# Patient Record
Sex: FEMALE | Race: WHITE | NOT HISPANIC OR LATINO | Employment: OTHER | URBAN - METROPOLITAN AREA
[De-identification: names, ages, dates, MRNs, and addresses within clinical notes are randomized per-mention and may not be internally consistent; named-entity substitution may affect disease eponyms.]

---

## 2022-12-05 ENCOUNTER — HOSPITAL ENCOUNTER (INPATIENT)
Facility: MEDICAL CENTER | Age: 67
LOS: 2 days | DRG: 176 | End: 2022-12-07
Attending: STUDENT IN AN ORGANIZED HEALTH CARE EDUCATION/TRAINING PROGRAM | Admitting: HOSPITALIST
Payer: MEDICARE

## 2022-12-05 DIAGNOSIS — I26.99 BILATERAL PULMONARY EMBOLISM (HCC): ICD-10-CM

## 2022-12-05 PROBLEM — R03.0 ELEVATED BLOOD PRESSURE READING: Status: ACTIVE | Noted: 2022-12-05

## 2022-12-05 PROBLEM — R79.89 ELEVATED TROPONIN: Status: ACTIVE | Noted: 2022-12-05

## 2022-12-05 LAB
EKG IMPRESSION: NORMAL
EST. AVERAGE GLUCOSE BLD GHB EST-MCNC: 108 MG/DL
HBA1C MFR BLD: 5.4 % (ref 4–5.6)
MAGNESIUM SERPL-MCNC: 2.3 MG/DL (ref 1.5–2.5)
TROPONIN T SERPL-MCNC: 107 NG/L (ref 6–19)

## 2022-12-05 PROCEDURE — 83735 ASSAY OF MAGNESIUM: CPT

## 2022-12-05 PROCEDURE — 99223 1ST HOSP IP/OBS HIGH 75: CPT | Performed by: HOSPITALIST

## 2022-12-05 PROCEDURE — 770020 HCHG ROOM/CARE - TELE (206)

## 2022-12-05 PROCEDURE — 700105 HCHG RX REV CODE 258: Performed by: HOSPITALIST

## 2022-12-05 PROCEDURE — 93010 ELECTROCARDIOGRAM REPORT: CPT | Performed by: INTERNAL MEDICINE

## 2022-12-05 PROCEDURE — 84484 ASSAY OF TROPONIN QUANT: CPT

## 2022-12-05 PROCEDURE — 36415 COLL VENOUS BLD VENIPUNCTURE: CPT

## 2022-12-05 PROCEDURE — 83036 HEMOGLOBIN GLYCOSYLATED A1C: CPT

## 2022-12-05 PROCEDURE — 93005 ELECTROCARDIOGRAM TRACING: CPT | Performed by: HOSPITALIST

## 2022-12-05 RX ORDER — ACETAMINOPHEN 325 MG/1
650 TABLET ORAL EVERY 6 HOURS PRN
Status: DISCONTINUED | OUTPATIENT
Start: 2022-12-05 | End: 2022-12-05

## 2022-12-05 RX ORDER — SODIUM CHLORIDE 9 MG/ML
INJECTION, SOLUTION INTRAVENOUS CONTINUOUS
Status: DISCONTINUED | OUTPATIENT
Start: 2022-12-05 | End: 2022-12-06

## 2022-12-05 RX ORDER — ACETAMINOPHEN 325 MG/1
650 TABLET ORAL EVERY 6 HOURS PRN
Status: DISCONTINUED | OUTPATIENT
Start: 2022-12-05 | End: 2022-12-07 | Stop reason: HOSPADM

## 2022-12-05 RX ORDER — POLYETHYLENE GLYCOL 3350 17 G/17G
1 POWDER, FOR SOLUTION ORAL
Status: DISCONTINUED | OUTPATIENT
Start: 2022-12-05 | End: 2022-12-07 | Stop reason: HOSPADM

## 2022-12-05 RX ORDER — ASPIRIN 81 MG/1
81 TABLET, CHEWABLE ORAL DAILY
Status: ON HOLD | COMMUNITY
End: 2022-12-07

## 2022-12-05 RX ORDER — ENOXAPARIN SODIUM 100 MG/ML
1 INJECTION SUBCUTANEOUS EVERY 12 HOURS
Status: DISCONTINUED | OUTPATIENT
Start: 2022-12-05 | End: 2022-12-06

## 2022-12-05 RX ORDER — ONDANSETRON 2 MG/ML
4 INJECTION INTRAMUSCULAR; INTRAVENOUS EVERY 4 HOURS PRN
Status: DISCONTINUED | OUTPATIENT
Start: 2022-12-05 | End: 2022-12-07 | Stop reason: HOSPADM

## 2022-12-05 RX ORDER — ONDANSETRON 4 MG/1
4 TABLET, ORALLY DISINTEGRATING ORAL EVERY 4 HOURS PRN
Status: DISCONTINUED | OUTPATIENT
Start: 2022-12-05 | End: 2022-12-07 | Stop reason: HOSPADM

## 2022-12-05 RX ORDER — BISACODYL 10 MG
10 SUPPOSITORY, RECTAL RECTAL
Status: DISCONTINUED | OUTPATIENT
Start: 2022-12-05 | End: 2022-12-07 | Stop reason: HOSPADM

## 2022-12-05 RX ORDER — AMOXICILLIN 250 MG
2 CAPSULE ORAL 2 TIMES DAILY
Status: DISCONTINUED | OUTPATIENT
Start: 2022-12-05 | End: 2022-12-07 | Stop reason: HOSPADM

## 2022-12-05 RX ADMIN — SODIUM CHLORIDE: 9 INJECTION, SOLUTION INTRAVENOUS at 22:28

## 2022-12-05 ASSESSMENT — ENCOUNTER SYMPTOMS
MYALGIAS: 0
WHEEZING: 0
BLURRED VISION: 0
NAUSEA: 0
HEADACHES: 0
SINUS PAIN: 0
DOUBLE VISION: 0
VOMITING: 0
CLAUDICATION: 0
PALPITATIONS: 0
BRUISES/BLEEDS EASILY: 0
COUGH: 0
HEARTBURN: 0
DEPRESSION: 0
SHORTNESS OF BREATH: 1
BACK PAIN: 0
DIZZINESS: 0
CHILLS: 0
FEVER: 0
PND: 0
HEMOPTYSIS: 0

## 2022-12-05 ASSESSMENT — COGNITIVE AND FUNCTIONAL STATUS - GENERAL
MOVING TO AND FROM BED TO CHAIR: A LITTLE
SUGGESTED CMS G CODE MODIFIER DAILY ACTIVITY: CJ
WALKING IN HOSPITAL ROOM: A LITTLE
MOVING FROM LYING ON BACK TO SITTING ON SIDE OF FLAT BED: A LITTLE
DRESSING REGULAR LOWER BODY CLOTHING: A LITTLE
STANDING UP FROM CHAIR USING ARMS: A LITTLE
HELP NEEDED FOR BATHING: A LITTLE
TURNING FROM BACK TO SIDE WHILE IN FLAT BAD: A LITTLE
CLIMB 3 TO 5 STEPS WITH RAILING: A LOT
SUGGESTED CMS G CODE MODIFIER MOBILITY: CK
DAILY ACTIVITIY SCORE: 21
MOBILITY SCORE: 17
DRESSING REGULAR UPPER BODY CLOTHING: A LITTLE

## 2022-12-05 ASSESSMENT — PATIENT HEALTH QUESTIONNAIRE - PHQ9
5. POOR APPETITE OR OVEREATING: NOT AT ALL
1. LITTLE INTEREST OR PLEASURE IN DOING THINGS: NOT AT ALL
8. MOVING OR SPEAKING SO SLOWLY THAT OTHER PEOPLE COULD HAVE NOTICED. OR THE OPPOSITE, BEING SO FIGETY OR RESTLESS THAT YOU HAVE BEEN MOVING AROUND A LOT MORE THAN USUAL: NOT AT ALL
SUM OF ALL RESPONSES TO PHQ9 QUESTIONS 1 AND 2: 1
6. FEELING BAD ABOUT YOURSELF - OR THAT YOU ARE A FAILURE OR HAVE LET YOURSELF OR YOUR FAMILY DOWN: NOT AL ALL
SUM OF ALL RESPONSES TO PHQ QUESTIONS 1-9: 2
4. FEELING TIRED OR HAVING LITTLE ENERGY: SEVERAL DAYS
2. FEELING DOWN, DEPRESSED, IRRITABLE, OR HOPELESS: SEVERAL DAYS
9. THOUGHTS THAT YOU WOULD BE BETTER OFF DEAD, OR OF HURTING YOURSELF: NOT AT ALL
3. TROUBLE FALLING OR STAYING ASLEEP OR SLEEPING TOO MUCH: NOT AT ALL
7. TROUBLE CONCENTRATING ON THINGS, SUCH AS READING THE NEWSPAPER OR WATCHING TELEVISION: NOT AT ALL

## 2022-12-05 ASSESSMENT — LIFESTYLE VARIABLES
HOW MANY TIMES IN THE PAST YEAR HAVE YOU HAD 5 OR MORE DRINKS IN A DAY: 0
CONSUMPTION TOTAL: NEGATIVE
HAVE YOU EVER FELT YOU SHOULD CUT DOWN ON YOUR DRINKING: NO
TOTAL SCORE: 0
EVER FELT BAD OR GUILTY ABOUT YOUR DRINKING: NO
HAVE PEOPLE ANNOYED YOU BY CRITICIZING YOUR DRINKING: NO
TOTAL SCORE: 0
ON A TYPICAL DAY WHEN YOU DRINK ALCOHOL HOW MANY DRINKS DO YOU HAVE: 0
AVERAGE NUMBER OF DAYS PER WEEK YOU HAVE A DRINK CONTAINING ALCOHOL: 0
TOTAL SCORE: 0
EVER HAD A DRINK FIRST THING IN THE MORNING TO STEADY YOUR NERVES TO GET RID OF A HANGOVER: NO
ALCOHOL_USE: NO
DOES PATIENT WANT TO STOP DRINKING: NO

## 2022-12-05 ASSESSMENT — PAIN DESCRIPTION - PAIN TYPE: TYPE: ACUTE PAIN

## 2022-12-05 NOTE — PROGRESS NOTES
[facility] HOSPITALIST TRIAGE OFFICER DIRECT ADMISSION REPORT  Transferring facility: [facility]  Transferring physician: Dr Bowen  Chief complaint:   Presented with SOB, CTA chest showed bilateral PE. Denies chest pain. Vitals requiring 3L O2, hemodynamically stable. Labs show Trop 81 initially and increased to 92. BNP 2k.     Further work up or recommendations per triage officer prior to transfer: trend troponin  Consultants called prior to transfer and pertinent input from consultants: none  Patient accepted for transfer: Yes  Consultants to be called upon arrival: none  Admission status: Inpatient.   Floor requested: tele      Please inform the triage officer upon arrival of the patient to [facility] for assignment of a hospitalist to perform admission.     For any question or concerns regarding the care of this patient, please reach out to the assigned hospitalist.

## 2022-12-06 ENCOUNTER — APPOINTMENT (OUTPATIENT)
Dept: CARDIOLOGY | Facility: MEDICAL CENTER | Age: 67
DRG: 176 | End: 2022-12-06
Attending: HOSPITALIST
Payer: MEDICARE

## 2022-12-06 LAB
ALBUMIN SERPL BCP-MCNC: 3.5 G/DL (ref 3.2–4.9)
ALBUMIN/GLOB SERPL: 1.1 G/DL
ALP SERPL-CCNC: 106 U/L (ref 30–99)
ALT SERPL-CCNC: 85 U/L (ref 2–50)
ANION GAP SERPL CALC-SCNC: 10 MMOL/L (ref 7–16)
AST SERPL-CCNC: 179 U/L (ref 12–45)
BILIRUB SERPL-MCNC: 0.4 MG/DL (ref 0.1–1.5)
BUN SERPL-MCNC: 19 MG/DL (ref 8–22)
CALCIUM SERPL-MCNC: 8.7 MG/DL (ref 8.5–10.5)
CHLORIDE SERPL-SCNC: 107 MMOL/L (ref 96–112)
CHOLEST SERPL-MCNC: 152 MG/DL (ref 100–199)
CO2 SERPL-SCNC: 21 MMOL/L (ref 20–33)
CREAT SERPL-MCNC: 1.02 MG/DL (ref 0.5–1.4)
ERYTHROCYTE [DISTWIDTH] IN BLOOD BY AUTOMATED COUNT: 48.4 FL (ref 35.9–50)
GFR SERPLBLD CREATININE-BSD FMLA CKD-EPI: 61 ML/MIN/1.73 M 2
GLOBULIN SER CALC-MCNC: 3.3 G/DL (ref 1.9–3.5)
GLUCOSE SERPL-MCNC: 103 MG/DL (ref 65–99)
HCT VFR BLD AUTO: 36.9 % (ref 37–47)
HDLC SERPL-MCNC: 34 MG/DL
HGB BLD-MCNC: 11.9 G/DL (ref 12–16)
LDLC SERPL CALC-MCNC: 92 MG/DL
LV EJECT FRACT  99904: 55
LV EJECT FRACT MOD 2C 99903: 34.71
LV EJECT FRACT MOD 4C 99902: 64.09
LV EJECT FRACT MOD BP 99901: 53.3
MCH RBC QN AUTO: 29.8 PG (ref 27–33)
MCHC RBC AUTO-ENTMCNC: 32.2 G/DL (ref 33.6–35)
MCV RBC AUTO: 92.5 FL (ref 81.4–97.8)
PLATELET # BLD AUTO: 210 K/UL (ref 164–446)
PMV BLD AUTO: 10.3 FL (ref 9–12.9)
POTASSIUM SERPL-SCNC: 3.9 MMOL/L (ref 3.6–5.5)
PROT SERPL-MCNC: 6.8 G/DL (ref 6–8.2)
RBC # BLD AUTO: 3.99 M/UL (ref 4.2–5.4)
SODIUM SERPL-SCNC: 138 MMOL/L (ref 135–145)
TRIGL SERPL-MCNC: 130 MG/DL (ref 0–149)
WBC # BLD AUTO: 5.4 K/UL (ref 4.8–10.8)

## 2022-12-06 PROCEDURE — 97163 PT EVAL HIGH COMPLEX 45 MIN: CPT

## 2022-12-06 PROCEDURE — 80053 COMPREHEN METABOLIC PANEL: CPT

## 2022-12-06 PROCEDURE — 85027 COMPLETE CBC AUTOMATED: CPT

## 2022-12-06 PROCEDURE — 700102 HCHG RX REV CODE 250 W/ 637 OVERRIDE(OP): Performed by: HOSPITALIST

## 2022-12-06 PROCEDURE — 97162 PT EVAL MOD COMPLEX 30 MIN: CPT

## 2022-12-06 PROCEDURE — 80061 LIPID PANEL: CPT

## 2022-12-06 PROCEDURE — 93306 TTE W/DOPPLER COMPLETE: CPT

## 2022-12-06 PROCEDURE — 770020 HCHG ROOM/CARE - TELE (206)

## 2022-12-06 PROCEDURE — 99232 SBSQ HOSP IP/OBS MODERATE 35: CPT | Performed by: HOSPITALIST

## 2022-12-06 PROCEDURE — 97166 OT EVAL MOD COMPLEX 45 MIN: CPT

## 2022-12-06 PROCEDURE — 700111 HCHG RX REV CODE 636 W/ 250 OVERRIDE (IP): Performed by: HOSPITALIST

## 2022-12-06 PROCEDURE — 93306 TTE W/DOPPLER COMPLETE: CPT | Mod: 26 | Performed by: INTERNAL MEDICINE

## 2022-12-06 PROCEDURE — A9270 NON-COVERED ITEM OR SERVICE: HCPCS | Performed by: HOSPITALIST

## 2022-12-06 RX ADMIN — ENOXAPARIN SODIUM 80 MG: 80 INJECTION SUBCUTANEOUS at 11:32

## 2022-12-06 RX ADMIN — APIXABAN 10 MG: 5 TABLET, FILM COATED ORAL at 17:23

## 2022-12-06 RX ADMIN — ENOXAPARIN SODIUM 80 MG: 80 INJECTION SUBCUTANEOUS at 01:48

## 2022-12-06 ASSESSMENT — ENCOUNTER SYMPTOMS
STRIDOR: 0
SPEECH CHANGE: 0
NERVOUS/ANXIOUS: 0
DEPRESSION: 0
HEADACHES: 0
NAUSEA: 0
COUGH: 0
EYE DISCHARGE: 0
DIZZINESS: 0
ABDOMINAL PAIN: 0
PALPITATIONS: 0
FEVER: 0
SHORTNESS OF BREATH: 1
CHILLS: 0
BACK PAIN: 0

## 2022-12-06 ASSESSMENT — COGNITIVE AND FUNCTIONAL STATUS - GENERAL
TOILETING: A LOT
WALKING IN HOSPITAL ROOM: A LITTLE
SUGGESTED CMS G CODE MODIFIER MOBILITY: CJ
DAILY ACTIVITIY SCORE: 17
HELP NEEDED FOR BATHING: A LOT
SUGGESTED CMS G CODE MODIFIER DAILY ACTIVITY: CK
MOBILITY SCORE: 22
CLIMB 3 TO 5 STEPS WITH RAILING: A LITTLE
DRESSING REGULAR LOWER BODY CLOTHING: A LITTLE
PERSONAL GROOMING: A LITTLE
DRESSING REGULAR UPPER BODY CLOTHING: A LITTLE

## 2022-12-06 ASSESSMENT — COPD QUESTIONNAIRES
DURING THE PAST 4 WEEKS HOW MUCH DID YOU FEEL SHORT OF BREATH: NONE/LITTLE OF THE TIME
DO YOU EVER COUGH UP ANY MUCUS OR PHLEGM?: NO/ONLY WITH OCCASIONAL COLDS OR INFECTIONS
COPD SCREENING SCORE: 2
HAVE YOU SMOKED AT LEAST 100 CIGARETTES IN YOUR ENTIRE LIFE: NO/DON'T KNOW

## 2022-12-06 ASSESSMENT — GAIT ASSESSMENTS
DEVIATION: ANTALGIC
ASSISTIVE DEVICE: FRONT WHEEL WALKER
GAIT LEVEL OF ASSIST: CONTACT GUARD ASSIST
DISTANCE (FEET): 125

## 2022-12-06 ASSESSMENT — ACTIVITIES OF DAILY LIVING (ADL): TOILETING: INDEPENDENT

## 2022-12-06 ASSESSMENT — FIBROSIS 4 INDEX: FIB4 SCORE: 6.01

## 2022-12-06 NOTE — THERAPY
Physical Therapy   Initial Evaluation     Patient Name: Tracy Herron  Age:  67 y.o., Sex:  female  Medical Record #: [id number]  Today's Date: 12/6/2022          Assessment  Patient is 67 y.o. female with no significant past medical history, she is only on aspirin, is coming today referred from outside hospital due to shortness of breath, and left lower extremity pain, apparently patient was sitting in her couch for several hours, when she stood up to go to the bathroom she started complaining of left lower extremity pain, she denies any swelling, patient also has been complaining of shortness of breath that made her go to the emergency room, in the emergency room patient had CTA of her chest + bilateral PE, patient has been started on Lovenox, patient had elevated troponin at 81 then trending up to 92, patient was transferred to [facility] for higher level of care to consider cardiology evaluation due to elevated troponin, left lower extremity ultrasound did not show DVT,.  Additional factors influencing patient status / progress : today, pt is supervised for most activity, though struggles with ambulation towards end due to chronic, painful B knees (pt states needs to have B TKA) and recent L LE pain from groin to ankle. PT to follow to trial stairs and progress activity. .      Plan    Recommend Physical Therapy 3 times per week until therapy goals are met for the following treatments:  Bed Mobility, Gait Training, Neuro Re-Education / Balance, Stair Training, and Therapeutic Activities    DC Equipment Recommendations: None  Discharge Recommendations: Recommend post-acute placement for additional physical therapy services prior to discharge home (vs home with family assist depending on progress)          Objective       12/06/22 0947   Charge Group   PT Evaluation PT Evaluation Mod   Total Time Spent   PT Total Time Yes   PT Evaluation Time Spent (Mins) 30   PT Total Time Spent (Calculated) 30   Initial Contact  Note    Initial Contact Note Order Received and Verified, Physical Therapy Evaluation in Progress with Full Report to Follow.   Vitals   Pulse 94   Pulse Oximetry 91 %   O2 (LPM) 3   O2 Delivery Device Silicone Nasal Cannula   Vitals Comments unable to obtain reliable pulse ox readings during ambulation.   Pain 0 - 10 Group   Therapist Pain Assessment During Activity;Nurse Notified  (B knee pain and L groin to ankle painful, not rated.)   Prior Living Situation   Prior Services None   Housing / Facility Mobile Home   Steps Into Home 3   Steps In Home 0   Rail Right Rail (Steps into Home)   Elevator No   Equipment Owned Single Point Cane;Wheelchair;Front-Wheel Walker   Lives with - Patient's Self Care Capacity Spouse;Adult Children   Comments spouse took this week off to help.   Prior Level of Functional Mobility   Bed Mobility Independent   Transfer Status Independent   Ambulation Independent   Distance Ambulation (Feet)   (community, does not own car, does not work, only limit to baseline activity is B knee pain as she says she needs B TKA)   Assistive Devices Used None  (difficult to obtain answer re AD use at home, says needed wc yesterday but not at baseline.)   History of Falls   History of Falls No  (pt denies any falls.)   Cognition    Cognition / Consciousness WDL   Level of Consciousness Alert   Active ROM Lower Body    Active ROM Lower Body  WDL   Strength Lower Body   Lower Body Strength  X   Comments functional for brief wt bearing, but knee pain limits endurance.   Balance Assessment   Sitting Balance (Static) Fair   Sitting Balance (Dynamic) Fair   Standing Balance (Static) Fair   Standing Balance (Dynamic) Fair -   Weight Shift Sitting Fair   Weight Shift Standing Fair   Comments with FWW   Gait Analysis   Gait Level Of Assist Contact Guard Assist   Assistive Device Front Wheel Walker   Distance (Feet) 125   # of Times Distance was Traveled 1   Deviation Antalgic   # of Stairs Climbed 0   Weight  Bearing Status no restrictions   Bed Mobility    Supine to Sit Supervised   Sit to Supine Supervised   Scooting Supervised   Rolling Supervised   Functional Mobility   Sit to Stand Supervised   Bed, Chair, Wheelchair Transfer Supervised   Transfer Method Stand Pivot   How much difficulty does the patient currently have...   Turning over in bed (including adjusting bedclothes, sheets and blankets)? 4   Sitting down on and standing up from a chair with arms (e.g., wheelchair, bedside commode, etc.) 4   Moving from lying on back to sitting on the side of the bed? 4   How much help from another person does the patient currently need...   Moving to and from a bed to a chair (including a wheelchair)? 4   Need to walk in a hospital room? 3   Climbing 3-5 steps with a railing? 3   6 clicks Mobility Score 22   Activity Tolerance   Standing 5 min   Short Term Goals    Short Term Goal # 1 Pt will negotiate 3 stairs with supervision to access home in 6 visits.   Short Term Goal # 2 Pt will ambulate x 125 feet using FWW with mod indep in 6 visits to improve functional indep.   Education Group   Education Provided Role of Physical Therapist   Role of Physical Therapist Patient Response Patient;Acceptance;Explanation;Verbal Demonstration   Problem List    Problems Decreased Activity Tolerance   Anticipated Discharge Equipment and Recommendations   DC Equipment Recommendations None   Discharge Recommendations Recommend post-acute placement for additional physical therapy services prior to discharge home  (vs home with family assist depending on progress)   Interdisciplinary Plan of Care Collaboration   IDT Collaboration with  Nursing   Patient Position at End of Therapy Seated;Call Light within Reach;Tray Table within Reach;Phone within Reach;Bed Alarm On   Collaboration Comments nsg updated   Session Information   Date / Session Number  12/6-1 (1/3, 12/12)

## 2022-12-06 NOTE — PROGRESS NOTES
Direct admit received, pt care assumed, assessment completed. Pt is A&O 4, pain 0/10, sinus rhythm on the monitor. Updated on POC, questions answered. Bed in lowest, locked position, treaded socks on, call light and belongings within reach. Fall precautions in place.

## 2022-12-06 NOTE — THERAPY
Occupational Therapy   Initial Evaluation     Patient Name: Tracy Herron  Age:  67 y.o., Sex:  female  Medical Record #: [id number]  Today's Date: 12/6/2022     Precautions  Precautions: Fall Risk  Comments: Pain in L groin, L knee, and L ankle; RN aware    Assessment  Patient is 67 y.o. female admitted for B PEs, LLE pain, and elevated troponin. PMHx of DVTs. Session limited by SOB and c/o L groin, knee, and ankle pain during functional mobility req return to sitting. Reports lives with  and adult son who she reports has autism, but reports family can assist PRN. Currently limited by decreased functional mobility, activity tolerance, cognition, sensation, strength, AROM, coordination, balance, adherence to precautions, and pain which are currently affecting pt's ability to complete ADLs/IADLs at baseline. Will continue to follow.     Plan    Recommend Occupational Therapy 3 times per week until therapy goals are met for the following treatments:  Adaptive Equipment, Neuro Re-Education / Balance, Self Care/Activities of Daily Living, Therapeutic Activities, and Therapeutic Exercises.    DC Equipment Recommendations: Unable to determine at this time  Discharge Recommendations: Recommend post-acute placement for additional occupational therapy services prior to discharge home      Objective     12/06/22 0902   Prior Living Situation   Prior Services Home-Independent   Housing / Facility   (trailer)   Steps Into Home 3   Bathroom Set up Bathtub / Shower Combination   Equipment Owned Single Point Cane;Wheelchair;Front-Wheel Walker   Lives with - Patient's Self Care Capacity Spouse;Adult Children   Comments Reports lives with  and adult son who she reports has autism, but reports family can assist PRN.   Prior Level of ADL Function   Self Feeding Independent   Grooming / Hygiene Independent   Bathing Independent   Dressing Independent   Toileting Independent   Prior Level of IADL Function   Medication  Management Independent   Laundry Independent   Kitchen Mobility Independent   Finances Independent   Home Management Independent   Shopping Independent   Prior Level Of Mobility Independent With Device in Home;Independent With Device in Community   Driving / Transportation Driving Independent   Occupation (Pre-Hospital Vocational) Not Employed   History of Falls   History of Falls No   Date of Last Fall   (pt denies)   Precautions   Precautions Fall Risk   Comments Pain in L groin, L knee, and L ankle; RN aware   Vitals   O2 (LPM) 3   O2 Delivery Device Silicone Nasal Cannula   Vitals Comments SpO2 >90% at beginning and end of session. unable to get SpO2 reading during functional ambulation   Pain 0 - 10 Group   Location Leg   Location Orientation Left   Therapist Pain Assessment Post Activity;During Activity;Nurse Notified  (not quantified; B knee pain, L groin and ankle pain; RN aware, may need US for DVT)   Cognition    Cognition / Consciousness WDL   Level of Consciousness Alert   Comments pleasant and cooperative   Passive ROM Upper Body   Passive ROM Upper Body WDL   Active ROM Upper Body   Active ROM Upper Body  WDL   Strength Upper Body   Upper Body Strength  X   Gross Strength Generalized Weakness, Equal Bilaterally.    Balance Assessment   Sitting Balance (Static) Fair   Sitting Balance (Dynamic) Fair   Standing Balance (Static) Fair -   Standing Balance (Dynamic) Fair -   Weight Shift Sitting Fair   Weight Shift Standing Fair   Comments w/FWW   Bed Mobility    Supine to Sit Supervised   Sit to Supine   (left up in chair)   Scooting Supervised   Comments HOB elevated   ADL Assessment   Eating Supervision   Grooming Supervision  (wiping face)   Lower Body Dressing Minimal Assist  (socks)   Toileting   (declined need)   Comments limited by fatigue and LLE pain   Functional Mobility   Sit to Stand Supervised   Bed, Chair, Wheelchair Transfer Standby Assist   Toilet Transfers   (declined need; likely will req  assist d/t pain)   Transfer Method Stand Step   Mobility w/FWW; c/o L groin, knee, and ankle pain during functional mobility   Edema / Skin Assessment   Edema / Skin  Not Assessed   Comments LLE not red or hot; RN aware of possible DVT   Activity Tolerance   Comments limited by fatigue, SOB, and pain   Patient / Family Goals   Patient / Family Goal #1 to go home   Short Term Goals   Short Term Goal # 1 LB dressing with SPV (pants)   Short Term Goal # 2 toilet txf with SPV   Short Term Goal # 3 standing g/h with SPV for 2min   Education Group   Education Provided Role of Occupational Therapist;Pathology of bedrest   Role of Occupational Therapist Patient Response Patient;Acceptance;Explanation;Verbal Demonstration;Reinforcement Needed   Pathology of Bedrest Patient Response Patient;Acceptance;Explanation;Verbal Demonstration;Reinforcement Needed   Problem List   Problem List Decreased Active Daily Living Skills;Decreased Homemaking Skills;Decreased Functional Mobility;Decreased Activity Tolerance;Impaired Postural Control / Balance

## 2022-12-06 NOTE — PROGRESS NOTES
4 Eyes Skin Assessment Completed by PERCY Romero and PERCY Arreola.    Head WDL  Ears WDL  Nose WDL  Mouth WDL  Neck WDL  Breast/Chest WDL  Shoulder Blades WDL  Spine Redness scratches on back  (R) Arm/Elbow/Hand Redness and Scab elbow  (L) Arm/Elbow/Hand Redness and Scab elbow  Abdomen WDL  Groin Redness moisture  Scrotum/Coccyx/Buttocks WDL  (R) Leg WDL  (L) Leg WDL  (R) Heel/Foot/Toe WDL  (L) Heel/Foot/Toe WDL          Devices In Places Tele Box, Pulse Ox, and Nasal Cannula      Interventions In Place NC W/Ear Foams and Pillows    Possible Skin Injury No    Pictures Uploaded Into Epic N/A  Wound Consult Placed N/A  RN Wound Prevention Protocol Ordered No

## 2022-12-06 NOTE — ASSESSMENT & PLAN NOTE
Presented to outside facility patient has CTA done at showed bilateral PE secondary branches of the main pulmonary artery (lower pulmonary arteries bilaterally, as per radiologist report there is no right-sided heart strain, heart size has been reported as normal.  Cardiogram is pending to assess for right heart strain  Patient was on enoxaparin this will be discontinued and patient has been initiated on apixaban 10 mg twice a day through 12/13 then she will be on 5 mg twice a day likely the rest of her life.  This is a second life event of a deep vein thrombosis of unclear etiology.  She will need follow-up with her primary care as discussed with her.  Per H&P left leg DVT ultrasound study was unremarkable  Wean oxygen if able we will check oxygen needs 12/7 AM to see if she will need to go home with home oxygen.  Patient denies any estrogen supplements, she denies any smoking history, she denies any lumps or bumps and denies any constitutional symptoms such as weight loss night sweats.  No melena no hemoptysis.    Given the patient's age I would recommend further evaluation outpatient by her primary care for hypercoagulable state rule out potential cancer.  I still feel immobility as a likely etiology of her blood clot.

## 2022-12-06 NOTE — H&P
Hospital Medicine History & Physical Note    Date of Service  12/5/2022    Primary Care Physician  No primary care provider on file.    Consultants  None    Code Status  Full Code    Chief Complaint  Bilateral PE    History of Presenting Illness  Tracy Herron is a 67 y.o. female who presented 12/5/2022 with no significant past medical history, she is only on aspirin, is coming today referred from outside hospital due to shortness of breath, and left lower extremity pain, apparently patient was sitting in her couch for several hours, when she stood up to go to the bathroom she started complaining of left lower extremity pain, she denies any swelling, patient also has been complaining of shortness of breath that made her go to the emergency room, in the emergency room patient had CTA of her chest because her D-dimer was elevated, CTA showed bilateral PE, patient has been started on Lovenox, patient had elevated troponin at 81 then trending up to 92, patient was transferred to [facility] for higher level of care to consider cardiology evaluation due to elevated troponin, when I saw the patient here she was in her room she is alert oriented follows commands she denies any chest pain she is on 2 L of oxygen and she feels comfortable with it, she is able to speak in full sentences.  Denies any numbness tingling, patient at referring hospital had CT head that did not show any acute findings, left lower extremity ultrasound did not show DVT, patient is going to continue Lovenox, will get echocardiogram continue telemetry, I have discussed case and plan of care with patient nurse staff request have been answered..        Review of Systems  Review of Systems   Constitutional:  Negative for chills and fever.   HENT:  Negative for congestion and sinus pain.    Eyes:  Negative for blurred vision and double vision.   Respiratory:  Positive for shortness of breath. Negative for cough, hemoptysis and wheezing.     Cardiovascular:  Negative for chest pain, palpitations, claudication, leg swelling and PND.   Gastrointestinal:  Negative for heartburn, nausea and vomiting.   Genitourinary:  Negative for hematuria and urgency.   Musculoskeletal:  Negative for back pain and myalgias.        Left lower extremity pain   Skin:  Negative for rash.   Neurological:  Negative for dizziness and headaches.   Endo/Heme/Allergies:  Does not bruise/bleed easily.   Psychiatric/Behavioral:  Negative for depression.      Past Medical History  No past medical history    Surgical History  No recent surgeries    Family History    Family history reviewed with patient. There is no family history that is pertinent to the chief complaint.     Social History  No smoking no alcohol no drugs    Allergies  No Known Allergies    Medications  Prior to Admission Medications   Prescriptions Last Dose Informant Patient Reported? Taking?   aspirin (ASA) 81 MG Chew Tab chewable tablet 12/3/22  Yes Yes   Sig: Chew 81 mg every day.      Facility-Administered Medications: None       Physical Exam  Pulse:  [96] 96  Resp:  [20] 20  BP: (162)/(92) 162/92  SpO2:  [92 %] 92 %  Blood Pressure : (!) 162/92       Pulse: 96   Respiration: 20   Pulse Oximetry: 92 %       Physical Exam  Vitals and nursing note reviewed.   Constitutional:       General: She is not in acute distress.     Appearance: Normal appearance. She is not ill-appearing.   HENT:      Head: Normocephalic and atraumatic.      Nose: Nose normal.      Mouth/Throat:      Mouth: Mucous membranes are moist.      Pharynx: Oropharynx is clear.   Eyes:      General: No scleral icterus.        Right eye: No discharge.         Left eye: No discharge.      Conjunctiva/sclera: Conjunctivae normal.      Pupils: Pupils are equal, round, and reactive to light.   Cardiovascular:      Rate and Rhythm: Normal rate and regular rhythm.      Pulses: Normal pulses.      Heart sounds: Normal heart sounds.   Pulmonary:       Effort: Pulmonary effort is normal. No respiratory distress.      Breath sounds: Normal breath sounds. No wheezing or rales.   Abdominal:      General: Bowel sounds are normal. There is no distension.      Palpations: Abdomen is soft.      Tenderness: There is no abdominal tenderness. There is no guarding.   Musculoskeletal:         General: Normal range of motion.      Cervical back: Normal range of motion and neck supple. No rigidity or tenderness.      Right lower leg: No edema.      Left lower leg: No edema.   Skin:     General: Skin is warm and dry.      Capillary Refill: Capillary refill takes less than 2 seconds.      Coloration: Skin is not jaundiced.   Neurological:      General: No focal deficit present.      Mental Status: She is alert and oriented to person, place, and time.      Cranial Nerves: No cranial nerve deficit.   Psychiatric:         Mood and Affect: Mood normal.         Behavior: Behavior normal.       Laboratory:          WBC 6.8, hemoglobin 13.6, platelet count 219, sodium 139, potassium 3.6, bicarb 21, creatinine 0.94, BUN 11    Imaging:  EC-ECHOCARDIOGRAM COMPLETE W/O CONT    (Results Pending)       X-Ray:  I have personally reviewed the images and compared with prior images.  EKG:  I have personally reviewed the images and compared with prior images.    Assessment/Plan:  Justification for Admission Status  I anticipate this patient will require at least two midnights for appropriate medical management, necessitating inpatient admission because continue telemetry, follow-up echocardiogram continue Lovenox        * Bilateral pulmonary embolism (HCC)- (present on admission)  Assessment & Plan  Presented to outside facility patient has CTA done at showed bilateral PE secondary branches of the main pulmonary artery (lower pulmonary arteries bilaterally, as per radiologist report there is no right-sided heart strain, heart size has been reported as normal.  Continue  Lovenox  Echocardiogram  Continue telemetry  If stable consider transitioning to oral AC tomorrow    Elevated blood pressure reading- (present on admission)  Assessment & Plan  No history of hypertension, continue close monitoring  If blood pressures remain elevated might require blood pressure medication    Elevated troponin- (present on admission)  Assessment & Plan  Probably related to her PE, follow-up echo continue trending troponin patient denies any chest pain.  EKG showed sinus rhythm no acute ischemic changes.      VTE prophylaxis: therapeutic anticoagulation with Lovenox

## 2022-12-06 NOTE — ASSESSMENT & PLAN NOTE
EKG reviewed  Likely demand ischemia from pulmonary embolism  Supplemental oxygen for now and assess for home needs  Anticoagulation for treatment of PE initiated  Monitoring on telemetry today  Will order a follow-up troponin  Patient only with deep inspiratory effort chest pain otherwise asymptomatic  12/6 Echo:  CONCLUSIONS  Normal left ventricular systolic function.  Mildly dilated right ventricle with borderline reduced RV systolic   function  Right ventricular systolic pressure is estimated to be  35  mmHg.

## 2022-12-06 NOTE — DISCHARGE PLANNING
Case Management Discharge Planning    Admission Date: 12/5/2022  GMLOS: 2.5  ALOS: 1    6-Clicks ADL Score: 21  6-Clicks Mobility Score: 17  PT and/or OT Eval ordered: Yes  Post-acute Referrals Ordered: No  Post-acute Choice Obtained: No  Has referral(s) been sent to post-acute provider:  NA      Anticipated Discharge Dispo: Discharge Disposition: Discharged to home/self care (01)    DME Needed: Pending O2 needs, likely to wean.    Action(s) Taken: Pt pending medical clearance, pt currently on 2 liters O2, likely to wean off. PT/OT ordered, CM needs not anticipated at this time.    Escalations Completed: None    Medically Clear: No    Next Steps: f/u with medical team and pt to discuss dc needs and barriers    Barriers to Discharge: Medical clearance

## 2022-12-06 NOTE — PROGRESS NOTES
Monitor Summary:     Rhythm: Sinus Rhythm    Rate: 94-83    Measurement: .17/.08/.36    Ectopy: R PACs    12 hr cc

## 2022-12-06 NOTE — PROGRESS NOTES
Lab called with critical result of Troponin at 107. Critical lab result read back to lab.   Dr. Duke paged at [phone], notified of critical lab result at 1488.  Critical lab result read back by Dr. Duke. No chest pain at this time.

## 2022-12-06 NOTE — CARE PLAN
The patient is Watcher - Medium risk of patient condition declining or worsening    Shift Goals  Clinical Goals: Monitor oxygen, left leg/arm discomfort  Patient Goals: L arm/leg discomfort    Progress made toward(s) clinical / shift goals:    Problem: Knowledge Deficit - Standard  Goal: Patient and family/care givers will demonstrate understanding of plan of care, disease process/condition, diagnostic tests and medications  Outcome: Progressing     Problem: Pain - Standard  Goal: Alleviation of pain or a reduction in pain to the patient’s comfort goal  Outcome: Progressing     Problem: Fall Risk  Goal: Patient will remain free from falls  Outcome: Progressing

## 2022-12-06 NOTE — PROGRESS NOTES
Hospital Medicine Daily Progress Note    Date of Service  12/6/2022    Chief Complaint  Short of breath    Hospital Course  Tracy Herron is a 67 y.o. female admitted 12/5/2022 with shortness of breath and sent from an outside hospital for bilateral pulmonary embolism found on CTA and was started on lovenox. She was transferred to [facility] due to uptrending troponins.    Interval Problem Update  12/6: Minimal chest discomfort with deep inspiratory effort.  Has complaint of left leg pain.  She did have an outside ultrasound at transferring facility which supposedly had no DVT noted.  An echocardiogram is pending.  I did discuss being on anticoagulation medication possibly for lifetime as this is a second occurrence for her.  She has no constitutional symptoms such as weight loss night sweats or any lumps or bumps that she is noted.  She has been sitting a great deal at home.  Denies any long distance car/plane/train trips.  I discussed the risk factors of being on anticoagulant and avoiding any contact activity and falls especially could.  I recommended no aspirin or NSAIDs with her anticoagulation.  I recommended medical compliance with her initiation of apixaban.    I have discussed this patient's plan of care and discharge plan at IDT rounds today with Case Management, Nursing, Nursing leadership, and other members of the IDT team.      Code Status  Full Code    Disposition  Patient is not medically cleared for discharge.   Anticipate discharge to to home with close outpatient follow-up.  I have placed the appropriate orders for post-discharge needs.    Review of Systems  Review of Systems   Constitutional:  Negative for chills and fever.   Eyes:  Negative for discharge.   Respiratory:  Positive for shortness of breath. Negative for cough and stridor.    Cardiovascular:  Positive for chest pain (with deep effort). Negative for palpitations and leg swelling.   Gastrointestinal:  Negative for abdominal pain and nausea.    Genitourinary:  Negative for dysuria and hematuria.   Musculoskeletal:  Negative for back pain and joint pain.   Skin:  Negative for rash.   Neurological:  Negative for dizziness, speech change and headaches.   Psychiatric/Behavioral:  Negative for depression. The patient is not nervous/anxious.       Physical Exam  Temp:  [36.6 °C (97.9 °F)-37.9 °C (100.2 °F)] 36.8 °C (98.2 °F)  Pulse:  [82-96] 86  Resp:  [16-20] 16  BP: (109-162)/(78-92) 124/78  SpO2:  [92 %-96 %] 92 %    Physical Exam  Vitals reviewed.   Constitutional:       Appearance: Normal appearance. She is not diaphoretic.   HENT:      Head: Normocephalic and atraumatic.      Nose: Nose normal.      Mouth/Throat:      Mouth: Mucous membranes are moist.      Pharynx: No oropharyngeal exudate.   Eyes:      General: No scleral icterus.        Right eye: No discharge.         Left eye: No discharge.      Extraocular Movements: Extraocular movements intact.      Conjunctiva/sclera: Conjunctivae normal.   Cardiovascular:      Rate and Rhythm: Normal rate and regular rhythm.      Pulses:           Radial pulses are 2+ on the right side and 2+ on the left side.        Dorsalis pedis pulses are 2+ on the right side and 2+ on the left side.      Heart sounds: No murmur heard.  Pulmonary:      Effort: Pulmonary effort is normal. No respiratory distress.      Breath sounds: Normal breath sounds. No wheezing or rales.   Abdominal:      General: Bowel sounds are normal. There is no distension.      Palpations: Abdomen is soft.   Musculoskeletal:         General: Swelling (left leg larged mid calf circumference than the right) present. No tenderness.      Cervical back: No tenderness. No muscular tenderness.   Lymphadenopathy:      Cervical: No cervical adenopathy.   Skin:     Coloration: Skin is not jaundiced or pale.   Neurological:      General: No focal deficit present.      Mental Status: She is alert and oriented to person, place, and time. Mental status is at  baseline.      Cranial Nerves: No cranial nerve deficit.   Psychiatric:         Mood and Affect: Mood normal.         Behavior: Behavior normal.       Fluids  No intake or output data in the 24 hours ending 12/06/22 1326    Laboratory  Recent Labs     12/06/22  0247   WBC 5.4   RBC 3.99*   HEMOGLOBIN 11.9*   HEMATOCRIT 36.9*   MCV 92.5   MCH 29.8   MCHC 32.2*   RDW 48.4   PLATELETCT 210   MPV 10.3     Recent Labs     12/06/22  0247   SODIUM 138   POTASSIUM 3.9   CHLORIDE 107   CO2 21   GLUCOSE 103*   BUN 19   CREATININE 1.02   CALCIUM 8.7             Recent Labs     12/06/22  0247   TRIGLYCERIDE 130   HDL 34*   LDL 92       Imaging  EC-ECHOCARDIOGRAM COMPLETE W/O CONT              Assessment/Plan  * Bilateral pulmonary embolism (HCC)- (present on admission)  Assessment & Plan  Presented to outside facility patient has CTA done at showed bilateral PE secondary branches of the main pulmonary artery (lower pulmonary arteries bilaterally, as per radiologist report there is no right-sided heart strain, heart size has been reported as normal.  Cardiogram is pending to assess for right heart strain  Patient was on enoxaparin this will be discontinued and patient has been initiated on apixaban 10 mg twice a day through 12/13 then she will be on 5 mg twice a day likely the rest of her life.  This is a second life event of a deep vein thrombosis of unclear etiology.  She will need follow-up with her primary care as discussed with her.  Per H&P left leg DVT ultrasound study was unremarkable  Wean oxygen if able we will check oxygen needs 12/7 AM to see if she will need to go home with home oxygen.  Patient denies any estrogen supplements, she denies any smoking history, she denies any lumps or bumps and denies any constitutional symptoms such as weight loss night sweats.  No melena no hemoptysis.    Given the patient's age I would recommend further evaluation outpatient by her primary care for hypercoagulable state rule out  potential cancer.  I still feel immobility as a likely etiology of her blood clot.    Elevated blood pressure reading- (present on admission)  Assessment & Plan  Per notes no mention of hypertension history  Monitor vitals    Elevated troponin- (present on admission)  Assessment & Plan  EKG reviewed  Likely demand ischemia from pulmonary embolism  Supplemental oxygen for now and assess for home needs  Anticoagulation for treatment of PE initiated  Monitoring on telemetry today  Will order a follow-up troponin  Patient only with deep inspiratory effort chest pain otherwise asymptomatic       VTE prophylaxis: therapeutic anticoagulation with apixaban    I have performed a physical exam and reviewed and updated ROS and Plan today (12/6/2022). In review of yesterday's note (12/5/2022), there are no changes except as documented above.

## 2022-12-07 ENCOUNTER — APPOINTMENT (OUTPATIENT)
Dept: RADIOLOGY | Facility: MEDICAL CENTER | Age: 67
DRG: 176 | End: 2022-12-07
Attending: HOSPITALIST
Payer: MEDICARE

## 2022-12-07 VITALS
HEIGHT: 63 IN | RESPIRATION RATE: 17 BRPM | HEART RATE: 78 BPM | WEIGHT: 184.97 LBS | TEMPERATURE: 97.7 F | OXYGEN SATURATION: 94 % | SYSTOLIC BLOOD PRESSURE: 130 MMHG | DIASTOLIC BLOOD PRESSURE: 89 MMHG | BODY MASS INDEX: 32.77 KG/M2

## 2022-12-07 PROBLEM — R79.89 ELEVATED TROPONIN: Status: RESOLVED | Noted: 2022-12-05 | Resolved: 2022-12-07

## 2022-12-07 LAB
ALBUMIN SERPL BCP-MCNC: 3.3 G/DL (ref 3.2–4.9)
ALBUMIN/GLOB SERPL: 1 G/DL
ALP SERPL-CCNC: 108 U/L (ref 30–99)
ALT SERPL-CCNC: 80 U/L (ref 2–50)
ANION GAP SERPL CALC-SCNC: 10 MMOL/L (ref 7–16)
AST SERPL-CCNC: 146 U/L (ref 12–45)
BILIRUB SERPL-MCNC: 0.4 MG/DL (ref 0.1–1.5)
BUN SERPL-MCNC: 15 MG/DL (ref 8–22)
CALCIUM SERPL-MCNC: 8.7 MG/DL (ref 8.5–10.5)
CHLORIDE SERPL-SCNC: 109 MMOL/L (ref 96–112)
CO2 SERPL-SCNC: 20 MMOL/L (ref 20–33)
CREAT SERPL-MCNC: 0.83 MG/DL (ref 0.5–1.4)
ERYTHROCYTE [DISTWIDTH] IN BLOOD BY AUTOMATED COUNT: 46.9 FL (ref 35.9–50)
GFR SERPLBLD CREATININE-BSD FMLA CKD-EPI: 78 ML/MIN/1.73 M 2
GLOBULIN SER CALC-MCNC: 3.3 G/DL (ref 1.9–3.5)
GLUCOSE SERPL-MCNC: 98 MG/DL (ref 65–99)
HCT VFR BLD AUTO: 34.3 % (ref 37–47)
HGB BLD-MCNC: 11.2 G/DL (ref 12–16)
MCH RBC QN AUTO: 29.8 PG (ref 27–33)
MCHC RBC AUTO-ENTMCNC: 32.7 G/DL (ref 33.6–35)
MCV RBC AUTO: 91.2 FL (ref 81.4–97.8)
PLATELET # BLD AUTO: 194 K/UL (ref 164–446)
PMV BLD AUTO: 10.3 FL (ref 9–12.9)
POTASSIUM SERPL-SCNC: 4 MMOL/L (ref 3.6–5.5)
PROT SERPL-MCNC: 6.6 G/DL (ref 6–8.2)
RBC # BLD AUTO: 3.76 M/UL (ref 4.2–5.4)
SODIUM SERPL-SCNC: 139 MMOL/L (ref 135–145)
WBC # BLD AUTO: 4.6 K/UL (ref 4.8–10.8)

## 2022-12-07 PROCEDURE — 76705 ECHO EXAM OF ABDOMEN: CPT

## 2022-12-07 PROCEDURE — A9270 NON-COVERED ITEM OR SERVICE: HCPCS | Performed by: HOSPITALIST

## 2022-12-07 PROCEDURE — 700102 HCHG RX REV CODE 250 W/ 637 OVERRIDE(OP): Performed by: HOSPITALIST

## 2022-12-07 PROCEDURE — 99239 HOSP IP/OBS DSCHRG MGMT >30: CPT | Performed by: HOSPITALIST

## 2022-12-07 PROCEDURE — 80053 COMPREHEN METABOLIC PANEL: CPT

## 2022-12-07 PROCEDURE — 85027 COMPLETE CBC AUTOMATED: CPT

## 2022-12-07 RX ADMIN — APIXABAN 10 MG: 5 TABLET, FILM COATED ORAL at 04:37

## 2022-12-07 ASSESSMENT — PAIN DESCRIPTION - PAIN TYPE: TYPE: ACUTE PAIN

## 2022-12-07 NOTE — PROGRESS NOTES
Monitor Summary:     Rhythm: Sinus Rhythm    Rate: 74-90    Measurement: .20/.08/.37    Ectopy: none    12 hr cc

## 2022-12-07 NOTE — DISCHARGE PLANNING
Received Choice form at 0847  Agency/Facility Name: [facility]   Referral sent per Choice form @ [id number]

## 2022-12-07 NOTE — PROGRESS NOTES
Report received from night nurse at 0730, pt care assumed. Pt seen at bedside, pt A&Ox4, on 1L NC, PIV intact and flushed, VSS, and states pain is 2/10. Pt is standby x1 assist.     Bed alarm on, bed in low locked position, call light is within reach, Fall precautions in place. All questions answered, pt indicates no other needs at this time.

## 2022-12-07 NOTE — DISCHARGE PLANNING
Case Management Discharge Planning    Admission Date: 12/5/2022  GMLOS: 2.5  ALOS: 2    6-Clicks ADL Score: 17  6-Clicks Mobility Score: 22  PT and/or OT Eval ordered: Yes  Post-acute Referrals Ordered: Yes  Post-acute Choice Obtained: Yes  Has referral(s) been sent to post-acute provider:  Yes      Anticipated Discharge Dispo: Discharge Disposition: Discharged to home/self care (01)    DME Needed: Yes    DME Ordered: Yes    Action(s) Taken: Spoke to pt at bedside, pt gave verbal choice for O2 - [facility], 2nd IMM given at bedside, pt verbalized.     Escalations Completed: None    Medically Clear: Yes    Next Steps: DC to home when O2 delivered.    Barriers to Discharge: Oxygen Delivery    1205 - Prior auth completed through [employer].

## 2022-12-07 NOTE — DISCHARGE INSTRUCTIONS
Discharge Instructions per Miguel A Cornejo D.O.      DIET: Healthy Balance Diet    ACTIVITY: Avoid contact activity/sports while on anticoagulation    DIAGNOSIS: Bilateral pulmonary embolism requiring supplemental oxygen upon discharge    Return to ER if worsening of breating.

## 2022-12-07 NOTE — PROGRESS NOTES
Bedside report received from day RN, pt care assumed, assessment completed. Pt is A&O 4, pain 0/10, sinus rhythm on the monitor. Updated on POC, questions answered. Bed in lowest, locked position, treaded socks on, call light and belongings within reach. Fall precautions in place.

## 2022-12-07 NOTE — FACE TO FACE
"Face to Face Note  -  Durable Medical Equipment    Miguel A Cornejo D.O. - NPI: [id number]  I certify that this patient is under my care and that they had a durable medical equipment(DME)face to face encounter by myself that meets the physician DME face-to-face encounter requirements with this patient on:    Date of encounter:   Patient:                    MRN:                       YOB: 2020  Tracy Herron  [id number]  1955     The encounter with the patient was in whole, or in part, for the following medical condition, which is the primary reason for durable medical equipment:  Other - Pulmonary embolism    I certify that, based on my findings, the following durable medical equipment is medically necessary:    Oxygen   HOME O2 Saturation Measurements:(Values must be present for Home Oxygen orders)  Room air sat at rest: 92  Room air sat with amb: 88  With liters of O2: .5, O2 sat at rest with O2: 91  With Liters of O2: 4, O2 sat with amb with O2 : 90  Is the patient mobile?: Yes  If patient feels more short of breath, they can go up to 6 liters per minute and contact healthcare provider.    Supporting Symptoms: The patient requires supplemental oxygen, as the following interventions have been tried with limited or no improvement: \"Ambulation with oximetry.    My Clinical findings support the need for the above equipment due to:  Hypoxia  "

## 2022-12-07 NOTE — DISCHARGE SUMMARY
Discharge Summary    CHIEF COMPLAINT ON ADMISSION  No chief complaint on file.      Reason for Admission  pulmonary embolism     Admission Date  12/5/2022    CODE STATUS  Full Code    HPI & HOSPITAL COURSE  Tracy Herron is a 67 y.o. female admitted 12/5/2022 with shortness of breath and sent from an outside hospital for bilateral pulmonary embolism found on CTA and was started on lovenox. She was transferred to [facility] due to uptrending troponins.    Patient was initiated on direct oral anticoagulant and given instructions on medications to avoid such as NSAIDs and aspirin.  She is told to avoid any contact activities and watch her self or falls for risk of intracranial hemorrhage or significant hematomas.  Patient was told to be compliant with her medication on a daily aspect.  This is the patient's second life event of a blood clot of unclear etiology.  After further evaluation of the patient it sounds like immobility and lack of exercise may be the initiating factor.  Nonetheless she should follow-up with an outpatient hypercoagulable study and further decision from her primary care and or hematologist to see if she needs lifelong anticoagulation.  Patient had no chest pain or significant shortness of breath during her hospitalization.  She was requiring 2 L of supplemental oxygen with ambulation.  She was set up for home oxygen.  Of note upon discharge I did call the patient's [facility] in [place] for where she wanted me to send her prescription.  They had the medication but need prior authorization.  I did ask our social work to make sure the authorization was cleared for which I was told it was.  I was later in the day told by nursing that authorization was not cleared.  I did likely have [facility] pharmacy still open I was able to call them and have the patient set up for her medication.    The patient did have some elevated liver enzymes she had no right upper quadrant discomfort I did do a ultrasound of the right  upper quadrant showed no acute issues but did show hepatic steatosis as likely etiology.  Encourage healthy diet lifestyle modifications for further weight reduction.  She should have follow-up outpatient for her elevated liver enzymes.    Therefore, she is discharged in good and stable condition to home with close outpatient follow-up.    The patient met 2-midnight criteria for an inpatient stay at the time of discharge.    Discharge Date  12/7/22    FOLLOW UP ITEMS POST DISCHARGE  None    DISCHARGE DIAGNOSES  Principal Problem:    Bilateral pulmonary embolism (HCC) POA: Yes  Active Problems:    Elevated blood pressure reading POA: Yes  Resolved Problems:    Elevated troponin POA: Yes      FOLLOW UP  No future appointments.  Anil Curtis M.D.  [address] [facility] [address]  [phone]    Schedule an appointment as soon as possible for a visit in 2 day(s)  For follow up of your pulmonary embolism. Need further evaluation to see if need to have lifetime anticoagulation as this has been your second life event of a blood clot.      MEDICATIONS ON DISCHARGE     Medication List        START taking these medications        Instructions   * apixaban 5mg Tabs  Commonly known as: ELIQUIS   Take 2 Tablets by mouth 2 times a day for 6 days. Indications: DVT/PE  Dose: 10 mg     * apixaban 5mg Tabs  Start taking on: December 14, 2022  Commonly known as: ELIQUIS   Take 1 Tablet by mouth 2 times a day for 30 days. Indications: DVT/PE  Dose: 5 mg           * This list has 2 medication(s) that are the same as other medications prescribed for you. Read the directions carefully, and ask your doctor or other care provider to review them with you.                STOP taking these medications      aspirin 81 MG Chew chewable tablet  Commonly known as: ASA              Allergies  No Known Allergies    DIET  Orders Placed This Encounter   Procedures    Diet Order Diet: Cardiac     Standing Status:   Standing     Number of  Occurrences:   1     Order Specific Question:   Diet:     Answer:   Cardiac [6]       ACTIVITY  As tolerated.  Weight bearing as tolerated    CONSULTATIONS  None    PROCEDURES  none    LABORATORY  Lab Results   Component Value Date    SODIUM 139 12/07/2022    POTASSIUM 4.0 12/07/2022    CHLORIDE 109 12/07/2022    CO2 20 12/07/2022    GLUCOSE 98 12/07/2022    BUN 15 12/07/2022    CREATININE 0.83 12/07/2022        Lab Results   Component Value Date    WBC 4.6 (L) 12/07/2022    HEMOGLOBIN 11.2 (L) 12/07/2022    HEMATOCRIT 34.3 (L) 12/07/2022    PLATELETCT 194 12/07/2022      US-RUQ   Final Result      Hepatic steatosis      EC-ECHOCARDIOGRAM COMPLETE W/O CONT   Final Result      12/6/22 Echocardiogram:  Normal left ventricular systolic function.  Mildly dilated right ventricle with borderline reduced RV systolic   function  Right ventricular systolic pressure is estimated to be  35  mmHg.      Total time of the discharge process exceeds 33 minutes.

## 2022-12-07 NOTE — CARE PLAN
The patient is Watcher - Medium risk of patient condition declining or worsening    Shift Goals  Clinical Goals: Monitor O2, L arm/leg numbness/discomfort  Patient Goals: Rest    Progress made toward(s) clinical / shift goals:    Problem: Knowledge Deficit - Standard  Goal: Patient and family/care givers will demonstrate understanding of plan of care, disease process/condition, diagnostic tests and medications  Outcome: Progressing     Problem: Pain - Standard  Goal: Alleviation of pain or a reduction in pain to the patient’s comfort goal  Outcome: Progressing     Problem: Fall Risk  Goal: Patient will remain free from falls  Outcome: Progressing

## 2022-12-07 NOTE — PROGRESS NOTES
Discharge order placed, pt transferred to discharge lounge via wheel chair, PIV still intact, pt tele box removed, pt fully clothed w/ all of her belongings, VSS and on 1L O2.

## 2022-12-08 NOTE — DISCHARGE PLANNING
"This CM received a call from the [facility] lounge requesting assistance for the Eliquis prescription.  Per patients  \"[facility] in [place]\" told him the prescription was not covered by their insurance.  CM called [facility] in [place] and was informed that her Medicaid insurance requires prior authorization.  This nurse asked if they will accept a 30 day free trial Eliquis card for no cost and [employer] [facility] stated, \"yes this will cover them for the 30 days.\"  CM provided the patient Tracy the [facility] day [facility] in [facility] and educated the patient and her  to follow up to made sure they get the prior auth for the continued Eliquis.  They verbalized understanding.    "